# Patient Record
Sex: MALE | Race: WHITE | URBAN - METROPOLITAN AREA
[De-identification: names, ages, dates, MRNs, and addresses within clinical notes are randomized per-mention and may not be internally consistent; named-entity substitution may affect disease eponyms.]

---

## 2017-05-22 ENCOUNTER — TELEPHONE (OUTPATIENT)
Dept: DERMATOLOGY | Age: 42
End: 2017-05-22

## 2018-10-25 DIAGNOSIS — R06.83 SNORING: Primary | ICD-10-CM

## 2018-10-25 DIAGNOSIS — R06.81 APNEA: ICD-10-CM

## 2023-03-24 DIAGNOSIS — R93.1 ELEVATED CORONARY ARTERY CALCIUM SCORE: ICD-10-CM

## 2023-03-24 DIAGNOSIS — R06.02 SOB (SHORTNESS OF BREATH): ICD-10-CM

## 2023-03-24 DIAGNOSIS — R07.9 CHEST PAIN, UNSPECIFIED TYPE: Primary | ICD-10-CM

## 2023-07-21 DIAGNOSIS — R07.9 CHEST PAIN, UNSPECIFIED TYPE: ICD-10-CM

## 2023-07-21 DIAGNOSIS — R06.02 SOB (SHORTNESS OF BREATH): Primary | ICD-10-CM

## 2023-07-24 RX ORDER — ROSUVASTATIN CALCIUM 5 MG/1
TABLET, COATED ORAL
Qty: 90 TABLET | Refills: 3 | Status: SHIPPED | OUTPATIENT
Start: 2023-07-24

## 2024-07-29 RX ORDER — ROSUVASTATIN CALCIUM 5 MG/1
TABLET, COATED ORAL
Qty: 90 TABLET | Refills: 3 | Status: SHIPPED | OUTPATIENT
Start: 2024-07-29

## 2025-01-31 RX ORDER — MESALAMINE 1.2 G/1
1.2 TABLET, DELAYED RELEASE ORAL 2 TIMES DAILY
Qty: 180 TABLET | Refills: 5 | Status: SHIPPED | OUTPATIENT
Start: 2025-01-31

## 2025-07-24 ENCOUNTER — OFFICE VISIT (OUTPATIENT)
Dept: INTERNAL MEDICINE CLINIC | Age: 50
End: 2025-07-24
Payer: COMMERCIAL

## 2025-07-24 VITALS
TEMPERATURE: 97.7 F | OXYGEN SATURATION: 99 % | SYSTOLIC BLOOD PRESSURE: 130 MMHG | BODY MASS INDEX: 37.61 KG/M2 | DIASTOLIC BLOOD PRESSURE: 84 MMHG | WEIGHT: 239.6 LBS | HEART RATE: 51 BPM | HEIGHT: 67 IN

## 2025-07-24 DIAGNOSIS — Z00.00 ANNUAL PHYSICAL EXAM: ICD-10-CM

## 2025-07-24 DIAGNOSIS — Z11.59 ENCOUNTER FOR HCV SCREENING TEST FOR LOW RISK PATIENT: ICD-10-CM

## 2025-07-24 DIAGNOSIS — I10 ESSENTIAL HYPERTENSION: ICD-10-CM

## 2025-07-24 DIAGNOSIS — E55.9 VITAMIN D INSUFFICIENCY: ICD-10-CM

## 2025-07-24 DIAGNOSIS — K51.90 CHRONIC ULCERATIVE COLITIS WITHOUT COMPLICATION, UNSPECIFIED LOCATION (HCC): Primary | Chronic | ICD-10-CM

## 2025-07-24 DIAGNOSIS — E78.2 MIXED HYPERLIPIDEMIA: ICD-10-CM

## 2025-07-24 DIAGNOSIS — F10.91 ALCOHOL USE DISORDER IN REMISSION: ICD-10-CM

## 2025-07-24 DIAGNOSIS — R53.83 FATIGUE, UNSPECIFIED TYPE: ICD-10-CM

## 2025-07-24 DIAGNOSIS — Z13.1 DIABETES MELLITUS SCREENING: ICD-10-CM

## 2025-07-24 PROBLEM — F10.20 ALCOHOL USE DISORDER, SEVERE, DEPENDENCE (HCC): Status: ACTIVE | Noted: 2025-07-24

## 2025-07-24 PROBLEM — R79.89 ABNORMAL LFTS: Status: ACTIVE | Noted: 2017-07-11

## 2025-07-24 PROBLEM — E88.810 METABOLIC SYNDROME: Status: ACTIVE | Noted: 2017-07-11

## 2025-07-24 PROBLEM — E88.810 METABOLIC SYNDROME: Status: RESOLVED | Noted: 2017-07-11 | Resolved: 2025-07-24

## 2025-07-24 PROCEDURE — 99386 PREV VISIT NEW AGE 40-64: CPT | Performed by: INTERNAL MEDICINE

## 2025-07-24 PROCEDURE — 3079F DIAST BP 80-89 MM HG: CPT | Performed by: INTERNAL MEDICINE

## 2025-07-24 PROCEDURE — 3075F SYST BP GE 130 - 139MM HG: CPT | Performed by: INTERNAL MEDICINE

## 2025-07-24 RX ORDER — MELOXICAM 15 MG/1
15 TABLET ORAL DAILY PRN
COMMUNITY
Start: 2025-06-18

## 2025-07-24 SDOH — ECONOMIC STABILITY: FOOD INSECURITY: WITHIN THE PAST 12 MONTHS, THE FOOD YOU BOUGHT JUST DIDN'T LAST AND YOU DIDN'T HAVE MONEY TO GET MORE.: NEVER TRUE

## 2025-07-24 SDOH — ECONOMIC STABILITY: FOOD INSECURITY: WITHIN THE PAST 12 MONTHS, YOU WORRIED THAT YOUR FOOD WOULD RUN OUT BEFORE YOU GOT MONEY TO BUY MORE.: NEVER TRUE

## 2025-07-24 ASSESSMENT — ENCOUNTER SYMPTOMS
SHORTNESS OF BREATH: 0
DIARRHEA: 0
ABDOMINAL PAIN: 0
EYE REDNESS: 0
COUGH: 0
COLOR CHANGE: 0
NAUSEA: 0

## 2025-07-24 ASSESSMENT — PATIENT HEALTH QUESTIONNAIRE - PHQ9
2. FEELING DOWN, DEPRESSED OR HOPELESS: NOT AT ALL
SUM OF ALL RESPONSES TO PHQ QUESTIONS 1-9: 0
1. LITTLE INTEREST OR PLEASURE IN DOING THINGS: NOT AT ALL
SUM OF ALL RESPONSES TO PHQ QUESTIONS 1-9: 0

## 2025-07-24 NOTE — ASSESSMENT & PLAN NOTE
Diagnosed in his early 20s, sees GI, Dr. Saxena, on mesalamine and clinically stable  -last colonoscopy 07/24, gets q 2 years  -continue mesalamine per GI

## 2025-07-24 NOTE — PROGRESS NOTES
Normal heart sounds.   Pulmonary:      Effort: Pulmonary effort is normal. No respiratory distress.      Breath sounds: Normal breath sounds.   Abdominal:      General: There is no distension.      Palpations: Abdomen is soft.      Tenderness: There is no abdominal tenderness.   Musculoskeletal:      Cervical back: Normal range of motion and neck supple.      Right lower leg: No edema.      Left lower leg: No edema.   Lymphadenopathy:      Cervical: No cervical adenopathy.   Skin:     General: Skin is warm and dry.      Coloration: Skin is not jaundiced.   Neurological:      Mental Status: He is alert and oriented to person, place, and time.   Psychiatric:         Behavior: Behavior normal.         The ASCVD Risk score (Jaskaran DK, et al., 2019) failed to calculate for the following reasons:    Cannot find a previous HDL lab    Cannot find a previous total cholesterol lab    Immunization History   Administered Date(s) Administered    COVID-19, PFIZER PURPLE top, DILUTE for use, (age 12 y+), 30mcg/0.3mL 03/12/2021, 04/02/2021    Influenza Virus Vaccine 11/01/2020, 11/01/2021    Influenza, FLUARIX, FLULAVAL, FLUZONE (age 6 mo+) and AFLURIA, (age 3 y+), Quadv PF, 0.5mL 11/04/2022    TDaP, ADACEL (age 10y-64y), BOOSTRIX (age 10y+), IM, 0.5mL 06/27/2024       Health Maintenance   Topic Date Due    Lipids  Never done    Depression Screen  Never done    HIV screen  Never done    Hepatitis C screen  Never done    Hepatitis B vaccine (1 of 3 - 19+ 3-dose series) Never done    Diabetes screen  Never done    Colorectal Cancer Screen  Never done    COVID-19 Vaccine (3 - 2024-25 season) 09/01/2024    Flu vaccine (1) 08/01/2025    DTaP/Tdap/Td vaccine (2 - Td or Tdap) 06/27/2034    Hepatitis A vaccine  Aged Out    Hib vaccine  Aged Out    Polio vaccine  Aged Out    Meningococcal (ACWY) vaccine  Aged Out    Meningococcal B vaccine  Aged Out    Pneumococcal 0-49 years Vaccine  Aged Out       Kelvin Rojo MD

## 2025-07-24 NOTE — ASSESSMENT & PLAN NOTE
Here for annual physical exam, overall doing well from a clinical standpoint.  As for health maintenance:  -colon cancer screen: Gets colonoscopies every 2 years given ulcerative colitis, last done last year  -prostate cancer screen: Will start at age 50.  Unclear why PSA has been checked in the last few years but was normal.  Denies history of testosterone treatment  -lung cancer screen: not indicated  -BP as above  -BMI 37, lipids A1c ordered   -HCV ordered   -negative depression screen  -Advised to eat a healthy, well-balanced diet  -Advised to exercise at least 30min/day 5x/week for heart and bone health  -Check skin regularly for new moles or changes in moles. wear sunscreen at least SPF 30 and don't forget to reapply every 3-4 hours or if you are in the water  -Follow up with dentist at least twice/year.  -Follow up with eye doctor at least once/year.

## 2025-07-24 NOTE — ASSESSMENT & PLAN NOTE
History of heavy drinking which resulted in liver dysfunction.  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

## 2025-07-30 DIAGNOSIS — E78.2 MIXED HYPERLIPIDEMIA: ICD-10-CM

## 2025-07-30 DIAGNOSIS — I10 ESSENTIAL HYPERTENSION: ICD-10-CM

## 2025-07-30 DIAGNOSIS — E55.9 VITAMIN D INSUFFICIENCY: Primary | ICD-10-CM

## 2025-08-14 ENCOUNTER — TELEPHONE (OUTPATIENT)
Dept: INTERNAL MEDICINE CLINIC | Age: 50
End: 2025-08-14